# Patient Record
Sex: FEMALE | Race: WHITE | NOT HISPANIC OR LATINO | Employment: STUDENT | URBAN - METROPOLITAN AREA
[De-identification: names, ages, dates, MRNs, and addresses within clinical notes are randomized per-mention and may not be internally consistent; named-entity substitution may affect disease eponyms.]

---
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
## 2021-01-05 ENCOUNTER — OFFICE VISIT (OUTPATIENT)
Dept: OBSTETRICS AND GYNECOLOGY | Facility: CLINIC | Age: 21
End: 2021-01-05

## 2021-01-05 VITALS
HEIGHT: 64 IN | DIASTOLIC BLOOD PRESSURE: 76 MMHG | SYSTOLIC BLOOD PRESSURE: 122 MMHG | BODY MASS INDEX: 25.78 KG/M2 | WEIGHT: 151 LBS

## 2021-01-05 DIAGNOSIS — Z30.430 ENCOUNTER FOR IUD INSERTION: Primary | ICD-10-CM

## 2021-01-05 LAB
B-HCG UR QL: NEGATIVE
INTERNAL NEGATIVE CONTROL: NORMAL
INTERNAL POSITIVE CONTROL: NORMAL
Lab: NORMAL

## 2021-01-05 PROCEDURE — 58300 INSERT INTRAUTERINE DEVICE: CPT | Performed by: NURSE PRACTITIONER

## 2021-01-05 PROCEDURE — 81025 URINE PREGNANCY TEST: CPT | Performed by: NURSE PRACTITIONER

## 2021-01-05 NOTE — PROGRESS NOTES
Procedure: IUD Insertion Procedure Note     Procedures    Pre procedure indication 1) Desires Kyleena  Post procedure indication 1) Desires Kyleena    The risks, benefits, and alternatives to Kyleena were explained at length with the patient. All her questions were answered and consents were signed.    The patient was placed in a dorsal lithotomy position on the examining table in [facility]. A bimanual exam confirmed the uterus was normal in size, anterior.  A warmed metal speculum was inserted into the vagina and the cervix was brought into view.    The cervix was prepped with Betadine. The anterior lip was grasped with a single-tooth tenaculum. The endometrial cavity was then sounded to 7 cm without use of a dilator. This sealed Kyleena package was opened and the IUD was removed in a sterile fashion.    The upper edge of the depth setting the flange was set at a uterine sound measured. The  was then carefully advanced to the cervical canal into the uterus to the level of the fundus.  The slider was then retracted about 1 cm and deployed the device. The device was then gently advanced to the fundus. The IUD was then released by pulling the slider down all the way. The  was removed carefully from the uterus. The threads were then cut leaving 2-3 cm visible outside of the cervix.  The single-tooth tenaculum was removed from the anterior lip. Good hemostasis was noted.     All other instruments were removed from the vagina.   There were no complications.  The patient tolerated the procedure well with a minimal amount of discomfort.    The patient was counseled about the need to return in 4 weeks with U/S for IUD check.     She was counseled about the need to use a backup method of contraception such as condoms until her post insertion exam was performed. The patient verbalized understanding that the Kyleena will need to be removed/replaced after 5 years. The patient is counseled to contact us if she has  any significant or increasing bleeding, pain, fever, chills, or other concerns. She is instructed to see a doctor right away if she believes that she may be pregnant at any time with the IUD in place.    Lisa Marcano, ANNA MARIE  01/05/2021     NDC #83684-566-52  LOT # [id number]  EXP # 09/1/2022  [facility] device

## 2021-01-18 RX ORDER — NORETHINDRONE ACETATE AND ETHINYL ESTRADIOL 1MG-20(21)
KIT ORAL
Qty: 28 TABLET | Refills: 0 | OUTPATIENT
Start: 2021-01-18 | End: 2022-07-08

## 2022-01-06 ENCOUNTER — OFFICE VISIT (OUTPATIENT)
Dept: OBSTETRICS AND GYNECOLOGY | Facility: CLINIC | Age: 22
End: 2022-01-06

## 2022-01-06 VITALS — SYSTOLIC BLOOD PRESSURE: 112 MMHG | DIASTOLIC BLOOD PRESSURE: 62 MMHG | BODY MASS INDEX: 26.61 KG/M2 | WEIGHT: 155 LBS

## 2022-01-06 DIAGNOSIS — Z30.431 IUD CHECK UP: ICD-10-CM

## 2022-01-06 DIAGNOSIS — Z11.3 SCREENING FOR STDS (SEXUALLY TRANSMITTED DISEASES): Primary | ICD-10-CM

## 2022-01-06 DIAGNOSIS — Z01.419 WOMEN'S ANNUAL ROUTINE GYNECOLOGICAL EXAMINATION: ICD-10-CM

## 2022-01-06 PROCEDURE — 99395 PREV VISIT EST AGE 18-39: CPT | Performed by: NURSE PRACTITIONER

## 2022-01-06 NOTE — PROGRESS NOTES
GYN Annual Exam     CC - Here for annual exam.        KATALINA Bahena is a 21 y.o. female, , who presents for annual well woman exam. Patient's last menstrual period was 2021 (approximate)..  Periods are regular every 25-35 days, lasting 2 days.   Dysmenorrhea:mild, occurring first 1-2 days of flow.  Patient reports problems with: none.  Since her last visit the patient underwent surgery for Tonsilectomy. Partner Status: Marital Status: single.  She is sexually active. She has not had new partners since her last STD testing.  She has had her HPV vaccines.     Additional OB/GYN History   Current contraception: contraceptive methods: IUD.  Insertion date: 2021- Nasreen  Desires to: continue contraception  Last Pap : N/A  Last Completed Pap Smear     This patient has no relevant Health Maintenance data.        History of abnormal Pap smear: no  Family history of uterine, colon, breast, or ovarian cancer: yes - Breast- PGM  Performs monthly Self-Breast Exam: yes  Exercises Regularly:yes  Feelings of Anxiety or Depression: no  Tobacco Usage?: No   OB History        0    Para   0    Term   0       0    AB   0    Living   0       SAB   0    IAB   0    Ectopic   0    Molar   0    Multiple   0    Live Births   0                Health Maintenance   Topic Date Due   • ANNUAL PHYSICAL  Never done   • HPV VACCINES (1 - 2-dose series) Never done   • HEPATITIS C SCREENING  Never done   • CHLAMYDIA SCREENING  Never done   • INFLUENZA VACCINE  2021   • COVID-19 Vaccine (3 - Booster for Pfizer series) 2021   • TDAP/TD VACCINES (2 - Td or Tdap) 2023   • Pneumococcal Vaccine 0-64  Aged Out   • MENINGOCOCCAL VACCINE  Aged Out       The additional following portions of the patient's history were reviewed and updated as appropriate: allergies, current medications, past family history, past medical history, past social history, past surgical history and problem list.    Review of  Systems   Constitutional: Negative.    HENT: Negative.    Eyes: Negative.    Respiratory: Negative.    Cardiovascular: Negative.    Gastrointestinal: Negative.    Endocrine: Negative.    Genitourinary: Negative.    Musculoskeletal: Negative.    Skin: Negative.    Allergic/Immunologic: Negative.    Neurological: Negative.    Hematological: Negative.    Psychiatric/Behavioral: Negative.          I have reviewed and agree with the HPI, ROS, and historical information as entered above. Lisa Sheriff Krys, APRN    Objective   /62   Wt 70.3 kg (155 lb)   LMP 12/29/2021 (Approximate)   Breastfeeding No   BMI 26.61 kg/m²     Physical Exam  Vitals and nursing note reviewed. Exam conducted with a chaperone present.   Constitutional:       General: She is not in acute distress.     Appearance: Normal appearance. She is not ill-appearing.   Pulmonary:      Effort: Pulmonary effort is normal.   Abdominal:      General: There is no distension.      Palpations: Abdomen is soft. There is no mass.      Tenderness: There is no abdominal tenderness. There is no guarding or rebound.      Hernia: No hernia is present.   Genitourinary:     General: Normal vulva.      Vagina: Normal.      Cervix: Normal.      Uterus: Normal.       Adnexa: Right adnexa normal and left adnexa normal.      Comments: IUD strings approx 3 cm  Skin:     General: Skin is warm and dry.   Neurological:      Mental Status: She is alert and oriented to person, place, and time.   Psychiatric:         Mood and Affect: Mood normal.         Behavior: Behavior normal.            Assessment and Plan    Problem List Items Addressed This Visit     None      Visit Diagnoses     Screening for STDs (sexually transmitted diseases)    -  Primary    Relevant Orders    Chlamydia trachomatis, Neisseria gonorrhoeae, PCR w/ confirmation - Swab, Cervix    Women's annual routine gynecological examination        IUD check up              1. GYN annual well woman exam.    2. Encouraged use of condoms for STD prevention.  3. Reviewed monthly self breast exams.  Instructed to call with lumps, pain, or breast discharge.    4. Reviewed exercise as a preventative health measures.   5. RTC in 1 year or PRN with problems  Return for Annual physical.      Lisa Marcano, APRN  01/06/2022

## 2022-01-08 LAB
C TRACH RRNA SPEC QL NAA+PROBE: NEGATIVE
N GONORRHOEA RRNA SPEC QL NAA+PROBE: NEGATIVE

## 2022-07-01 ENCOUNTER — TELEPHONE (OUTPATIENT)
Dept: OBSTETRICS AND GYNECOLOGY | Facility: CLINIC | Age: 22
End: 2022-07-01

## 2022-07-01 NOTE — TELEPHONE ENCOUNTER
Pt. Will be back around May of next year. Informed as long as she is not having any issues it would be fine. She VU

## 2022-07-01 NOTE — TELEPHONE ENCOUNTER
Pt is due for annual in January. She will be in [place] for an extended time at that time. She wants to know if it is ok to wait longer than one year to check her IUD and schedule her annual for when she is back in the [place]

## 2022-07-05 ENCOUNTER — LAB (OUTPATIENT)
Dept: OBSTETRICS AND GYNECOLOGY | Facility: CLINIC | Age: 22
End: 2022-07-05

## 2022-07-05 ENCOUNTER — TELEPHONE (OUTPATIENT)
Dept: OBSTETRICS AND GYNECOLOGY | Facility: CLINIC | Age: 22
End: 2022-07-05

## 2022-07-05 DIAGNOSIS — Z32.00 POSSIBLE PREGNANCY, NOT CONFIRMED: Primary | ICD-10-CM

## 2022-07-05 NOTE — TELEPHONE ENCOUNTER
Per pt she has been having some light spotting and L sided cramping. She states she cannot find her IUD strings and she had a + UPT 2 days ago.     I recommended she come in today for a STAT HCG to confirm pregnancy.    She VU and asked that we do not share any information with anyone but her.

## 2022-07-05 NOTE — TELEPHONE ENCOUNTER
Patient lvm states she had iud placed 1/2 year ago, and cannot find her strings, is concerned it may have moved

## 2022-07-06 ENCOUNTER — OFFICE VISIT (OUTPATIENT)
Dept: OBSTETRICS AND GYNECOLOGY | Facility: CLINIC | Age: 22
End: 2022-07-06

## 2022-07-06 VITALS — BODY MASS INDEX: 26.19 KG/M2 | DIASTOLIC BLOOD PRESSURE: 62 MMHG | WEIGHT: 152.6 LBS | SYSTOLIC BLOOD PRESSURE: 100 MMHG

## 2022-07-06 DIAGNOSIS — O00.202 LEFT OVARIAN PREGNANCY WITHOUT INTRAUTERINE PREGNANCY: Primary | ICD-10-CM

## 2022-07-06 DIAGNOSIS — Z32.01 POSITIVE PREGNANCY TEST: ICD-10-CM

## 2022-07-06 DIAGNOSIS — Z30.432 ENCOUNTER FOR IUD REMOVAL: ICD-10-CM

## 2022-07-06 LAB — HCG INTACT+B SERPL-ACNC: 227 MIU/ML

## 2022-07-06 PROCEDURE — 99214 OFFICE O/P EST MOD 30 MIN: CPT | Performed by: NURSE PRACTITIONER

## 2022-07-06 PROCEDURE — 58301 REMOVE INTRAUTERINE DEVICE: CPT | Performed by: NURSE PRACTITIONER

## 2022-07-06 RX ORDER — FLUOXETINE HYDROCHLORIDE 40 MG/1
CAPSULE ORAL
COMMUNITY
Start: 2022-04-28

## 2022-07-06 RX ORDER — HYDROXYZINE HYDROCHLORIDE 10 MG/1
TABLET, FILM COATED ORAL
COMMUNITY
Start: 2022-04-28 | End: 2022-07-08

## 2022-07-06 NOTE — PROGRESS NOTES
Chief complaint:  Has positive UPT and has IUD         HPI  Mary Bahena is a 21 y.o. female, , who presents for possible ectopic pregnancy.  Patient with +UPT 7/3/2022, and was not able to feel IUD strings.  She also c/o mild LLQ pain, left shoulder pain and scant spotting x 1 week.  Kyleena IUD was inserted in 2021. Pt. States has had regular monthly periods since insertion. She did not return for 4 week follow up ultrasound scheduled after insertion in 2021. She did have an annual exam 2022 and IUD strings were visualized on exam at that visit. LMP 22 and did not have period in 2022. States started feeling LLQ pain and was unable to feel IUD strings so took UPT and was positive 2 days ago. STAT HCG yesterday= 227.     Recent Tests:  She had a STAT HCG 2022 that resulted 227. She has not had methotrexate. She had a pelvic ultrasound today and the findings were suspicious for ectopic pregnancy on left adnexa.. The patient complains of cramping pain.  The pain is located in her left-lower quadrant and pain in her left shoulder. Her past medical history is non-contributory.    Rh Status: Unknown  She reports no additional symptoms or complaints.    The additional following portions of the patient's history were reviewed and updated as appropriate: allergies, current medications, past family history, past medical history, past social history, past surgical history and problem list.  Review of Systems  Review of Systems   Constitutional: Negative.    Cardiovascular: Negative.    Gastrointestinal: Positive for abdominal pain ( LLQ pain).   Genitourinary: Positive for pelvic pain (mild) and vaginal bleeding (spotting).        I have reviewed and agree with the HPI, ROS, and historical information as entered above. Luna Torres, APRN    Objective   Physical Exam  /62   Wt 69.2 kg (152 lb 9.6 oz)   LMP 2022 (Exact Date)   Breastfeeding No   BMI 26.19 kg/m²     Physical  Exam  Vitals and nursing note reviewed. Exam conducted with a chaperone present.   Constitutional:       Appearance: Normal appearance. She is normal weight.   Abdominal:      Tenderness: There is abdominal tenderness in the suprapubic area and left lower quadrant.      Comments: Mild LLQ tenderness on exam, IUD strings visualized and IUD was removed today due to suspected ectoptic pregnancy left adnexa   Genitourinary:     General: Normal vulva.      Labia:         Right: No rash, tenderness or lesion.         Left: No rash, tenderness or lesion.       Uterus: Not enlarged and not tender.       Adnexa:         Left: Mass and tenderness present.       Comments: Mild tenderness left adnexa, suspected ectopic pregnancy measuring 23 x 15 x 15 mm    IUD strings visualized on exam   Neurological:      Mental Status: She is alert.           Assessment and Plan    Problem List Items Addressed This Visit    None     Visit Diagnoses     Left ovarian pregnancy without intrauterine pregnancy    -  Primary    Relevant Orders    HCG, B-subunit, Quantitative    CBC (No Diff)    Comprehensive Metabolic Panel    ABO / Rh    Positive pregnancy test        Relevant Orders    US Ob Transvaginal          1. Ultrasound findings reviewed with  on call MD, ( patient). Plan per  is to order STAT labs and prepare for MTX tomorrow.   2. Pelvic Rest.  No douching, intercourse or use of tampons.  3. Call for an increase in bleeding, abdominal pain, or fever.  4. Serial HCG.  5. Report to ED if increase in pain or bleeding.    6. Ultrasound findings reviewed with pt. Suspected ectopic pregnancy left adnexa. STAT HCG, CBC, CMP, TYPE & Rh NOW. HCG from 7/5= 227.   7. Will review labs and call out patient infusion tomorrow morning to schedule MTX if labs are ok.   8. IUD removed today without difficulty.   9. Pt. Understands must go to ER at [facility] for severe pain or heavy bleeding and cannot wait  until tomorrow if pain becomes severe.   10. Blood type unknown and ordered with labs today.   11. Methotrexate consent signed and scanned in chart today.   12. Pt. Has a written plan for moving forward following MTX tomorrow. She has order for repeat HCG on Saturday 7/9/2022.  13. Will RTC in one week for follow up and repeat ultrasound with  or Callum THRASHER.      ANNA MARIE Camacho  07/06/2022

## 2022-07-06 NOTE — PROGRESS NOTES
IUD Removal Procedure Note    Procedures    Type of IUD:  Kyleena   Date of insertion:  1/5/2021  Reason for removal:  had + UPT at home, U/S shows IUD in HIEU and ectopic pregnancy    Procedure Time Out Documentation    Procedure Details  IUD strings visible:  yes  Removal:  IUD strings grasped and IUD removed intact with gentle traction.  The patient tolerated the procedure well.    All appropriate instructions regarding removal were reviewed.    Tolerated well  No apparent complications  Post procedure diagnosis : IUD removal     Plans for contraception:  Interested in Nexplanon    The patient was advised to call for any fever or for prolonged or severe pain or bleeding. She was advised to use motrin as needed for mild to moderate pain.     SHE CURRENTLY HAS AN ECTOPIC PREGNANCY IN LEFT ADNEXA. IUD REMOVED DUE TO BEING IN HIEU/CERVIX ON ULTRASOUND.     Luna Torres, ANNA MARIE  07/06/2022

## 2022-07-07 ENCOUNTER — APPOINTMENT (OUTPATIENT)
Dept: ONCOLOGY | Facility: HOSPITAL | Age: 22
End: 2022-07-07

## 2022-07-07 ENCOUNTER — TELEPHONE (OUTPATIENT)
Dept: OBSTETRICS AND GYNECOLOGY | Facility: CLINIC | Age: 22
End: 2022-07-07

## 2022-07-07 NOTE — TELEPHONE ENCOUNTER
See office notes from yesterday's visit on 7/6/22. This patient was worked up for a suspected ectopic pregnancy in left adnexa. See ultrasound report 7/6/22. She had HCG on 7/5/22= 227, and repeat HCG 7/6= 487. She had STAT labs 7/6 CBC, CMP, HCG, and Type & Rh.  MBT= B negative. She has had a very scant amount of spotting since she missed her period in June. She complained of LLQ pain and left shoulder pain for past week and rates pain today 5/10 today.  LMP 5/22/22 was a normal cycle. She has had Kyleena IUD since 1/2021 and did not return for 4 week follow up ultrasound following insertion. States she has had regular monthly periods since IUD was inserted.  IUD was removed yesterday since U/S showed it was incorrectly positioned in HIEU/cervix area.   Pt. Had recent intercourse on 5/22/22, 6/3, 6/11, and 6/18 so conception date can not be determined.     She was scheduled to have MTX injection this afternoon but since HCG steven appropriately will need to reevaluate tomorrow.     Discussed findings with , and plan is for patient to have  STAT HCG and progesterone levels tomorrow 7/8 in am. Based on labs and pain will determine if MTX indicated tomorrow. I  Tried to schedule possible MTX injections for tomorrow afternoon but they are completely booked. If this patient needs MTX prior to Monday, will need to be given in ED. Ectopic precautions given to patient. She understands she must be seen for increased pelvic pain or heavy bleeding.    She understands since blood type is B negative she will need Rhogam when she comes in office tomorrow for STAT HCG and progesterone level in am.

## 2022-07-08 ENCOUNTER — HOSPITAL ENCOUNTER (EMERGENCY)
Facility: HOSPITAL | Age: 22
Discharge: HOME | End: 2022-07-08
Attending: OBSTETRICS & GYNECOLOGY | Admitting: OBSTETRICS & GYNECOLOGY

## 2022-07-08 ENCOUNTER — TELEPHONE (OUTPATIENT)
Dept: OBSTETRICS AND GYNECOLOGY | Facility: CLINIC | Age: 22
End: 2022-07-08

## 2022-07-08 ENCOUNTER — LAB (OUTPATIENT)
Dept: OBSTETRICS AND GYNECOLOGY | Facility: CLINIC | Age: 22
End: 2022-07-08

## 2022-07-08 VITALS
WEIGHT: 150 LBS | HEIGHT: 64 IN | OXYGEN SATURATION: 96 % | DIASTOLIC BLOOD PRESSURE: 72 MMHG | TEMPERATURE: 98.6 F | RESPIRATION RATE: 16 BRPM | SYSTOLIC BLOOD PRESSURE: 104 MMHG | HEART RATE: 87 BPM | BODY MASS INDEX: 25.61 KG/M2

## 2022-07-08 DIAGNOSIS — Z34.90 PREGNANCY, UNSPECIFIED GESTATIONAL AGE: Primary | ICD-10-CM

## 2022-07-08 PROBLEM — O00.102 LEFT TUBAL PREGNANCY WITHOUT INTRAUTERINE PREGNANCY: Status: ACTIVE | Noted: 2022-07-08

## 2022-07-08 PROCEDURE — 25010000002 METHOTREXATE PF 100 MG/4ML SOLUTION: Performed by: OBSTETRICS & GYNECOLOGY

## 2022-07-08 PROCEDURE — 96372 THER/PROPH/DIAG INJ SC/IM: CPT

## 2022-07-08 PROCEDURE — 96372 THER/PROPH/DIAG INJ SC/IM: CPT | Performed by: OBSTETRICS & GYNECOLOGY

## 2022-07-08 PROCEDURE — 99283 EMERGENCY DEPT VISIT LOW MDM: CPT

## 2022-07-08 RX ORDER — METHOTREXATE 25 MG/ML
50 INJECTION, SOLUTION INTRA-ARTERIAL; INTRAMUSCULAR; INTRATHECAL; INTRAVENOUS ONCE
Status: COMPLETED | OUTPATIENT
Start: 2022-07-08 | End: 2022-07-08

## 2022-07-08 RX ADMIN — METHOTREXATE 86 MG: 25 SOLUTION INTRA-ARTERIAL; INTRAMUSCULAR; INTRATHECAL; INTRAVENOUS at 14:54

## 2022-07-08 NOTE — TELEPHONE ENCOUNTER
Reviewed hcg levels with LOS. She recommended moving forward with the MTX. Rhogam was given this morning while in the office. Called outpt infusion to schedule and the PCT I spoke with spoke with someone else, came back and said they did not have the staff to accommodate anymore work ins today. Per LOS: pt. Will need to go to the ER for MTX. Informed patient of this recommendation. Spoke with the ER charge nurse who discussed with Dr. Barba. She will have to come in with an OBGYN consult, then LOS would need to come to the ER to  patient on the MTX and put in the order. Informed that we had already done this yesterday when the patient was here. Dr. Barba stated that the counseling needed to be done today before they could give the injection. LOS aware.

## 2022-07-08 NOTE — TELEPHONE ENCOUNTER
Informed pt of results, however, must review with LOS who was in surgery today. Will call back when I am able to speak with LOS

## 2022-07-08 NOTE — H&P
Mary Bahena is a 21 y.o. female, , who presents for MTX. Unable to given in office due to [facility] restrictions and outpatient infusion too bust to work her in.  Patient with +UPT 7/3/2022, and was not able to feel IUD strings.  She was seen by Luna CMGILL and IUD was removed and u/s performed suspicious for ectopic with 2 cm mass in fallopian tube on left.  Her HCG however doubled and therefore MTX was not given on that day.  Today however her HCG decreased and it is now safe to go ahead with MTX knowing this is not a viable or heterotopic pregnancy.  IUD was inserted  but she did not follow up for placement.  She did not return for 4 week follow up ultrasound scheduled after insertion in 2021. She did have an annual exam 2022 and IUD strings were visualized on exam at that visit. LMP 22 and did not have period in 2022. States started feeling LLQ pain and was unable to feel IUD strings so took UPT and was positive 2 days ago. STAT HCG yesterday= 227.      Recent Tests:     The additional following portions of the patient's history were reviewed and updated as appropriate: allergies, current medications, past family history, past medical history, past social history, past surgical history and problem list.    Review of Systems  Review of Systems   Constitutional: Negative.    Cardiovascular: Negative.    Gastrointestinal: Positive for abdominal pain ( LLQ pain).   Genitourinary: Positive for pelvic pain (mild) and vaginal bleeding (spotting).         I have reviewed and agree with the HPI, ROS, and historical information as entered above.      Objective      Physical Exam  Vitals:    22 1331   BP:    Pulse:    Resp:    Temp:    SpO2: 95%      Breastfeeding No   BMI 26.19 kg/m²      Physical Exam    Physical Exam  Constitutional:       Appearance: She is well-developed.   HENT:      Head: Normocephalic.   Eyes:      Conjunctiva/sclera: Conjunctivae normal.   Pulmonary:       Effort: Pulmonary effort is normal.   Psychiatric:         Behavior: Behavior normal.       Constitutional:       Appearance: Normal appearance. She is normal weight.   Abdominal:      Tenderness: There is minimal abdominal tenderness today.    Neurological:      Mental Status: She is alert.             Assessment      Assessment and Plan     Left ectopic pregnancy with no evidence of rupture and decreasing HCG.  Good candidate for MTX.  Rh negative          1. Ultrasound findings and labs reviewed again with patient.    2. Patient desires medical treatment with MTX.  3. Pelvic Rest.  No douching, intercourse or use of tampons.  4. Call for an increase in bleeding, abdominal pain, or fever.  5. Serial HCG.  6. Report to ED if increase in pain or bleeding.    7. Pt. Understands must go to ER at [facility] for severe pain  Until which time it is deemed the MTX to be effective.  F/U for HCG  Tuesday and then again on day 7.  She is to expect vaginal bleeding while the HCG levels are falling.   8. Blood type negative.  Rhogam given this am in office.  9. Methotrexate consent signed and already in chart.

## 2022-07-08 NOTE — TELEPHONE ENCOUNTER
PT'S MOTHER (OLGA) CALLED AND IS VERY CONCERNED. SHE AND DINORA BOTH HAVE QUESTIONS AND WOULD LIKE TO SPEAK WITH SOMEONE.

## 2022-07-11 ENCOUNTER — LAB (OUTPATIENT)
Dept: OBSTETRICS AND GYNECOLOGY | Facility: CLINIC | Age: 22
End: 2022-07-11

## 2022-07-11 ENCOUNTER — TELEPHONE (OUTPATIENT)
Dept: OBSTETRICS AND GYNECOLOGY | Facility: CLINIC | Age: 22
End: 2022-07-11

## 2022-07-11 DIAGNOSIS — Z34.90 PREGNANCY, UNSPECIFIED GESTATIONAL AGE: Primary | ICD-10-CM

## 2022-07-11 LAB — HCG INTACT+B SERPL-ACNC: 72.9 MIU/ML

## 2022-07-11 NOTE — TELEPHONE ENCOUNTER
Pt was told to r/s her appt for this Fri. Front staff cannot r/s her and she would like to speak to nurse that it would be ok to keep current appt.    Please advise.

## 2022-07-11 NOTE — TELEPHONE ENCOUNTER
This patient was seen for an ectopic preg in ED 7/8/22.Per pt Dr Mayorga informed her to come in for blood draw today and Friday. Luna called her today to tell her she also needs an us Friday with an appt. Call sent to [facility] to schedule an appt for US and NP or MD Friday.

## 2022-07-15 ENCOUNTER — LAB (OUTPATIENT)
Dept: OBSTETRICS AND GYNECOLOGY | Facility: CLINIC | Age: 22
End: 2022-07-15

## 2022-07-15 ENCOUNTER — TELEPHONE (OUTPATIENT)
Dept: OBSTETRICS AND GYNECOLOGY | Facility: CLINIC | Age: 22
End: 2022-07-15

## 2022-07-15 DIAGNOSIS — O00.102 LEFT TUBAL PREGNANCY WITHOUT INTRAUTERINE PREGNANCY: Primary | ICD-10-CM

## 2022-07-15 NOTE — TELEPHONE ENCOUNTER
PT ASKED IF THE U/S SHE HAS SCHEDULED FOR TODAY IS NECESSARY OR IF SHE COULD HAVE IT DONE ANOTHER TIME AND JUST HAVE THE BLOODWORK DONE TODAY, STATED SHE WOULD HAVE TO RESCHEDULE OTHERWISE

## 2022-07-15 NOTE — TELEPHONE ENCOUNTER
Pt. Reports heavy bleeding and passing out earlier this week. States her bleeding has slowed now and she is feeling fine. Per TH: come in for stat hcg, cbc, and cmp to ensure she does not need another dose of MTX. Pt. ELDA will come over as soon as she can for labs only.

## 2022-07-20 LAB
ALBUMIN SERPL-MCNC: 4.4 G/DL (ref 3.5–5.2)
ALBUMIN/GLOB SERPL: 2.3 G/DL
ALP SERPL-CCNC: 42 U/L (ref 39–117)
ALT SERPL-CCNC: 11 U/L (ref 1–33)
AST SERPL-CCNC: 15 U/L (ref 1–32)
BILIRUB SERPL-MCNC: 0.3 MG/DL (ref 0–1.2)
BUN SERPL-MCNC: 11 MG/DL (ref 6–20)
BUN/CREAT SERPL: 19 (ref 7–25)
CALCIUM SERPL-MCNC: 9 MG/DL (ref 8.6–10.5)
CHLORIDE SERPL-SCNC: 105 MMOL/L (ref 98–107)
CO2 SERPL-SCNC: 29 MMOL/L (ref 22–29)
CREAT SERPL-MCNC: 0.58 MG/DL (ref 0.57–1)
EGFRCR SERPLBLD CKD-EPI 2021: 133 ML/MIN/1.73
ERYTHROCYTE [DISTWIDTH] IN BLOOD BY AUTOMATED COUNT: 13.4 % (ref 12.3–15.4)
GLOBULIN SER CALC-MCNC: 1.9 GM/DL
GLUCOSE SERPL-MCNC: 77 MG/DL (ref 65–99)
HCG INTACT+B SERPL-ACNC: 14 MIU/ML
HCT VFR BLD AUTO: 35.6 % (ref 34–46.6)
HGB BLD-MCNC: 11.7 G/DL (ref 12–15.9)
MCH RBC QN AUTO: 29.8 PG (ref 26.6–33)
MCHC RBC AUTO-ENTMCNC: 32.8 G/DL (ref 31.5–35.7)
MCV RBC AUTO: 90.6 FL (ref 79–97)
PLATELET # BLD AUTO: 342 10E3/MM3 (ref 140–450)
POTASSIUM SERPL-SCNC: 4.4 MMOL/L (ref 3.5–5.2)
PROT SERPL-MCNC: 6.3 G/DL (ref 6–8.5)
RBC # BLD AUTO: 3.93 10E6/MM3 (ref 3.77–5.28)
SODIUM SERPL-SCNC: 141 MMOL/L (ref 136–145)
WBC # BLD AUTO: 5.39 10E3/MM3 (ref 3.4–10.8)

## 2022-07-22 ENCOUNTER — LAB (OUTPATIENT)
Dept: OBSTETRICS AND GYNECOLOGY | Facility: CLINIC | Age: 22
End: 2022-07-22

## 2022-07-23 LAB
ABO GROUP BLD: NORMAL
ERYTHROCYTE [DISTWIDTH] IN BLOOD BY AUTOMATED COUNT: 13.4 % (ref 12.3–15.4)
HCT VFR BLD AUTO: 35.7 % (ref 34–46.6)
HGB BLD-MCNC: 12 G/DL (ref 12–15.9)
MCH RBC QN AUTO: 29.6 PG (ref 26.6–33)
MCHC RBC AUTO-ENTMCNC: 33.6 G/DL (ref 31.5–35.7)
MCV RBC AUTO: 88.1 FL (ref 79–97)
PLATELET # BLD AUTO: 381 10*3/MM3 (ref 140–450)
RBC # BLD AUTO: 4.05 10*6/MM3 (ref 3.77–5.28)
RH BLD: NEGATIVE
WBC # BLD AUTO: 5.09 10*3/MM3 (ref 3.4–10.8)

## 2022-08-02 ENCOUNTER — OFFICE VISIT (OUTPATIENT)
Dept: OBSTETRICS AND GYNECOLOGY | Facility: CLINIC | Age: 22
End: 2022-08-02

## 2022-08-02 VITALS — WEIGHT: 151 LBS | DIASTOLIC BLOOD PRESSURE: 64 MMHG | SYSTOLIC BLOOD PRESSURE: 118 MMHG | BODY MASS INDEX: 25.92 KG/M2

## 2022-08-02 DIAGNOSIS — Z30.017 NEXPLANON INSERTION: Primary | ICD-10-CM

## 2022-08-02 LAB
B-HCG UR QL: NEGATIVE
EXPIRATION DATE: NORMAL
INTERNAL NEGATIVE CONTROL: NORMAL
INTERNAL POSITIVE CONTROL: NORMAL
Lab: NORMAL

## 2022-08-02 PROCEDURE — 11981 INSERTION DRUG DLVR IMPLANT: CPT | Performed by: NURSE PRACTITIONER

## 2022-08-02 PROCEDURE — 81025 URINE PREGNANCY TEST: CPT | Performed by: NURSE PRACTITIONER

## 2022-08-19 LAB
ABO GROUP BLD: NORMAL
ALBUMIN SERPL-MCNC: 4.7 G/DL (ref 3.5–5.2)
ALBUMIN/GLOB SERPL: 2.5 G/DL
ALP SERPL-CCNC: 44 U/L (ref 39–117)
ALT SERPL-CCNC: 9 U/L (ref 1–33)
AST SERPL-CCNC: 16 U/L (ref 1–32)
BILIRUB SERPL-MCNC: 0.3 MG/DL (ref 0–1.2)
BUN SERPL-MCNC: 8 MG/DL (ref 6–20)
BUN/CREAT SERPL: 13.8 (ref 7–25)
CALCIUM SERPL-MCNC: 9.2 MG/DL (ref 8.6–10.5)
CHLORIDE SERPL-SCNC: 100 MMOL/L (ref 98–107)
CO2 SERPL-SCNC: 27 MMOL/L (ref 22–29)
CREAT SERPL-MCNC: 0.58 MG/DL (ref 0.57–1)
EGFRCR-CYS SERPLBLD CKD-EPI 2021: 133.1 ML/MIN/1.73
ERYTHROCYTE [DISTWIDTH] IN BLOOD BY AUTOMATED COUNT: 13.8 % (ref 12.3–15.4)
GLOBULIN SER CALC-MCNC: 1.9 GM/DL
GLUCOSE SERPL-MCNC: 81 MG/DL (ref 65–99)
HCG INTACT+B SERPL-ACNC: 487 MIU/ML
HCT VFR BLD AUTO: 37.5 % (ref 34–46.6)
HGB BLD-MCNC: 12.5 G/DL (ref 12–15.9)
MCH RBC QN AUTO: 30.3 PG (ref 26.6–33)
MCHC RBC AUTO-ENTMCNC: 33.3 G/DL (ref 31.5–35.7)
MCV RBC AUTO: 90.8 FL (ref 79–97)
PLATELET # BLD AUTO: 377 10E3/MM3 (ref 140–450)
POTASSIUM SERPL-SCNC: 3.7 MMOL/L (ref 3.5–5.2)
PROT SERPL-MCNC: 6.6 G/DL (ref 6–8.5)
RBC # BLD AUTO: 4.13 10E6/MM3 (ref 3.77–5.28)
RH BLD: NEGATIVE
SODIUM SERPL-SCNC: 136 MMOL/L (ref 136–145)
WBC # BLD AUTO: 7.68 10E3/MM3 (ref 3.4–10.8)

## 2022-08-23 LAB
HCG INTACT+B SERPL-ACNC: 251 MIU/ML
PROGEST SERPL-MCNC: 2.28 NG/ML

## 2024-01-09 ENCOUNTER — HOSPITAL ENCOUNTER (OUTPATIENT)
Facility: HOSPITAL | Age: 24
Discharge: HOME | End: 2024-01-09
Attending: INTERNAL MEDICINE | Admitting: INTERNAL MEDICINE
Payer: COMMERCIAL

## 2024-01-09 VITALS — DIASTOLIC BLOOD PRESSURE: 71 MMHG | TEMPERATURE: 98.3 F | SYSTOLIC BLOOD PRESSURE: 109 MMHG | RESPIRATION RATE: 16 BRPM

## 2024-01-09 PROCEDURE — G0378 HOSPITAL OBSERVATION PER HR: HCPCS

## 2024-01-09 NOTE — NURSING NOTE
Patient was accepted under hospitalist service as a direct admit from colorectal surgery center after colonoscopy. Upon arrival to floor, patient stated she wanted to go home. MD Jimenez assessed patient and spoke with MD Ortiz, patient is cleared to discharge home and instructed to call colorectal surgery office with any concerns or questions.

## 2024-01-13 NOTE — H&P
[facility] Medicine Services  HISTORY AND PHYSICAL    Patient Name: Mary Bahena  : 2000  MRN: [id number]  Primary Care Physician: Jeremísa Johnson MD  Date of admission: 2024      Subjective   Subjective     Chief Complaint:  Abd pain     HPI:  Mary Bahena is a 23 y.o. female with recent abdominal pain, who had colonoscopy today (Dr Ortiz), felt very bad afterward and was referred for direct admission to [facility].  Upon arriving, however, she felt better and wished to go home immediately.  After discussion w Dr Ortiz by phone, she is being discharged.  She did not get IV fluids or any medicines and stayed only a few minutes.     Dr Ortiz has called medicines including steroids in to her pharmacy.       Personal History     Past Medical History:   Diagnosis Date    Anxiety     Depression     HPV vaccine series completed            Past Surgical History:   Procedure Laterality Date    REDUCTION MAMMAPLASTY      TONSILLECTOMY      WISDOM TOOTH EXTRACTION         Family History: family history includes Breast cancer in her paternal grandmother; No Known Problems in an other family member.     Social History:  reports that she has never smoked. She has never used smokeless tobacco. She reports current alcohol use of about 1.0 - 2.0 standard drink of alcohol per week. She reports that she does not use drugs.  Social History     Social History Narrative    Not on file       Medications:  Available home medication information reviewed.  FLUoxetine, albuterol sulfate HFA, hydrOXYzine, norethindrone-ethinyl estradiol FE, and ondansetron ODT    Allergies   Allergen Reactions    Sulfa Antibiotics GI Intolerance       Objective   Objective     Vital Signs:           Physical Exam    Gen:  WD/WN.  Father present.  Mother listening in by phone   Neuro: alert and oriented, clear speech, follows commands, grossly nonfocal  HEENT:  NC/AT PERRL, OP benign  Neck:  Supple, no LAD  Heart RRR  no murmur, rub, or gallop  Abd:  Soft, mildly tender   Extrem:  No c/c/e      Result Review:  I have personally reviewed the results from the time of this admission to 1/13/2024 14:20 EST and agree with these findings:  []  Laboratory list / accordion  []  Microbiology  []  Radiology  []  EKG/Telemetry   []  Cardiology/Vascular   []  Pathology  []  Old records  []  Other:  Most notable findings include:        LAB RESULTS:      Lab 01/07/24 1856   PROTIME 13.1   INR 1.2   APTT 29.9             Lab 01/07/24  1856   LIPASE 12                 Lab 01/07/24  1856   ANTIBODY SCREEN Negative             Microbiology Results (last 10 days)       ** No results found for the last 240 hours. **            No radiology results from the last 24 hrs        Assessment & Plan   Assessment & Plan       * No active hospital problems. *     Plan: DC home.  Take meds as prescribed.  Drink fluids to offset diarrhea.  FU with Dr Ortiz.  Return to ED if clinically worsening.       DVT prophylaxis:  No DVT prophylaxis order currently exists.      CODE STATUS:    There are no questions and answers to display.       Expected Discharge   Expected Discharge Date: 1/9/2024; Expected Discharge Time:  3:00 PM     Sugey Jimenez MD  01/13/24

## 2024-01-13 NOTE — DISCHARGE SUMMARY
[facility] Medicine Services  SAME DAY ADMISSION & DISCHARGE    Patient Name: Mary Bahena  : 2000  MRN: [id number]    Date of Admission and Discharge: 2024    Primary Care Physician: Jeremías Johnson MD  Consults       No orders found from 2023 to 1/10/2024.            Subjective   Presentation and Brief Hospital Summary     Chief Complaint:  Colitis [K52.9]    There are no hospital problems to display for this patient.        HPI and Brief Hospital Course:  Mary Bahena is a 23 y.o. female with recent symptoms suggesting IBD.  She had a colonoscopy, had increased pain afterward and was referred for direct admission.  However, upon arrival she felt better.  I discussed plans w patient, parents, and Dr Ortiz. She is going home.  See H and P     Review of Systems   Abd pain, some nausea, tolerating fluids   All other systems reviewed and are negative.    Personal History     Past Medical History:   Diagnosis Date    Anxiety     Depression     HPV vaccine series completed        Past Surgical History:   Procedure Laterality Date    REDUCTION MAMMAPLASTY      TONSILLECTOMY      WISDOM TOOTH EXTRACTION         Family History: family history includes Breast cancer in her paternal grandmother; No Known Problems in an other family member.     Social History:  reports that she has never smoked. She has never used smokeless tobacco. She reports current alcohol use of about 1.0 - 2.0 standard drink of alcohol per week. She reports that she does not use drugs.    Allergies:    Allergies   Allergen Reactions    Sulfa Antibiotics GI Intolerance       Objective   Objective Findings     Vital Signs:            Physical Exam (if not performed and documented at time of presentation on same date):   Gen:  WD/WN  Neuro: alert and oriented, clear speech, follows commands, grossly nonfocal  HEENT:  NC/AT PERRL, OP benign  Neck:  Supple, no LAD  Heart RRR no murmur, rub, or  "gallop  Abd:  Soft, tender   Extrem:  No c/c/e      Results Reviewed:  I have personally reviewed current lab, radiology, and data and agree.    Results from last 7 days   Lab Units 01/07/24  1856   INR INR 1.2           Invalid input(s): \"TROPONININT\"  No results found for: \"PHART\", \"PCO2\"  Imaging Results (Last 24 Hours)       ** No results found for the last 24 hours. **                Discharge Details        Discharge Medications        Continue These Medications        Instructions Start Date   albuterol sulfate  (90 Base) MCG/ACT inhaler  Commonly known as: PROVENTIL HFA;VENTOLIN HFA;PROAIR HFA   INHALE 1 OR 2 PUFFS BY MOUTH DAILY AS NEEDED FOR WHEEZING      FLUoxetine 40 MG capsule  Commonly known as: PROzac   No dose, route, or frequency recorded.      FLUoxetine 10 MG capsule  Commonly known as: PROzac   10 mg, Oral, Daily      hydrOXYzine 25 MG tablet  Commonly known as: ATARAX   25 mg, Oral, Nightly      ondansetron ODT 4 MG disintegrating tablet  Commonly known as: ZOFRAN-ODT   4 mg, Translingual, 4 Times Daily PRN                 Discharge Disposition:  Home or Self Care    Discharge Diet:      Discharge Activity:      CODE STATUS:   There are no questions and answers to display.       Time Spent on Discharge: I spent  30  minutes on this discharge activity which included: face-to-face encounter with the patient, reviewing the data in the system, coordination of the care with the nursing staff as well as consultants, documentation, and entering orders.      Sugey Jimenez MD  01/13/24   "

## 2024-05-07 ENCOUNTER — TRANSCRIBE ORDERS (OUTPATIENT)
Dept: ADMINISTRATIVE | Facility: HOSPITAL | Age: 24
End: 2024-05-07
Payer: COMMERCIAL

## 2024-05-07 DIAGNOSIS — R20.0 NUMBNESS OF LEFT FOOT: Primary | ICD-10-CM

## 2024-08-18 ENCOUNTER — HOSPITAL ENCOUNTER (EMERGENCY)
Facility: HOSPITAL | Age: 24
Discharge: HOME | End: 2024-08-18
Attending: EMERGENCY MEDICINE | Admitting: EMERGENCY MEDICINE
Payer: COMMERCIAL

## 2024-08-18 VITALS
HEIGHT: 64 IN | TEMPERATURE: 98.4 F | HEART RATE: 71 BPM | BODY MASS INDEX: 25.61 KG/M2 | WEIGHT: 150 LBS | RESPIRATION RATE: 20 BRPM | OXYGEN SATURATION: 100 % | SYSTOLIC BLOOD PRESSURE: 102 MMHG | DIASTOLIC BLOOD PRESSURE: 71 MMHG

## 2024-08-18 DIAGNOSIS — G43.101 MIGRAINE WITH AURA AND WITH STATUS MIGRAINOSUS, NOT INTRACTABLE: Primary | ICD-10-CM

## 2024-08-18 PROCEDURE — 99283 EMERGENCY DEPT VISIT LOW MDM: CPT

## 2024-08-18 PROCEDURE — 25010000002 METOCLOPRAMIDE PER 10 MG

## 2024-08-18 PROCEDURE — 96375 TX/PRO/DX INJ NEW DRUG ADDON: CPT

## 2024-08-18 PROCEDURE — 96374 THER/PROPH/DIAG INJ IV PUSH: CPT

## 2024-08-18 PROCEDURE — 25010000002 ONDANSETRON PER 1 MG

## 2024-08-18 PROCEDURE — 25010000002 KETOROLAC TROMETHAMINE PER 15 MG

## 2024-08-18 PROCEDURE — 25810000003 SODIUM CHLORIDE 0.9 % SOLUTION

## 2024-08-18 RX ORDER — KETOROLAC TROMETHAMINE 30 MG/ML
30 INJECTION, SOLUTION INTRAMUSCULAR; INTRAVENOUS ONCE
Status: COMPLETED | OUTPATIENT
Start: 2024-08-18 | End: 2024-08-18

## 2024-08-18 RX ORDER — SODIUM CHLORIDE 9 MG/ML
125 INJECTION, SOLUTION INTRAVENOUS CONTINUOUS
Status: DISCONTINUED | OUTPATIENT
Start: 2024-08-18 | End: 2024-08-18

## 2024-08-18 RX ORDER — SODIUM CHLORIDE 0.9 % (FLUSH) 0.9 %
10 SYRINGE (ML) INJECTION AS NEEDED
Status: DISCONTINUED | OUTPATIENT
Start: 2024-08-18 | End: 2024-08-18 | Stop reason: HOSPADM

## 2024-08-18 RX ORDER — METOCLOPRAMIDE HYDROCHLORIDE 5 MG/ML
10 INJECTION INTRAMUSCULAR; INTRAVENOUS ONCE
Status: COMPLETED | OUTPATIENT
Start: 2024-08-18 | End: 2024-08-18

## 2024-08-18 RX ORDER — ONDANSETRON 4 MG/1
4 TABLET, ORALLY DISINTEGRATING ORAL EVERY 8 HOURS PRN
Qty: 20 TABLET | Refills: 0 | Status: SHIPPED | OUTPATIENT
Start: 2024-08-18

## 2024-08-18 RX ORDER — ONDANSETRON 2 MG/ML
4 INJECTION INTRAMUSCULAR; INTRAVENOUS EVERY 6 HOURS PRN
Status: DISCONTINUED | OUTPATIENT
Start: 2024-08-18 | End: 2024-08-18 | Stop reason: HOSPADM

## 2024-08-18 RX ADMIN — METOCLOPRAMIDE 10 MG: 5 INJECTION, SOLUTION INTRAMUSCULAR; INTRAVENOUS at 14:01

## 2024-08-18 RX ADMIN — SODIUM CHLORIDE 1000 ML: 9 INJECTION, SOLUTION INTRAVENOUS at 15:19

## 2024-08-18 RX ADMIN — SODIUM CHLORIDE 1000 ML: 9 INJECTION, SOLUTION INTRAVENOUS at 14:01

## 2024-08-18 RX ADMIN — ONDANSETRON 4 MG: 2 INJECTION INTRAMUSCULAR; INTRAVENOUS at 14:01

## 2024-08-18 RX ADMIN — KETOROLAC TROMETHAMINE 30 MG: 30 INJECTION, SOLUTION INTRAMUSCULAR at 14:01

## 2024-08-18 NOTE — Clinical Note
[facility] EMERGENCY DEPARTMENT [place]  [address]  [facility] [address]  Phone: [phone]  Fax: [phone]    Mary Bahena was seen and treated in our emergency department on 8/18/2024.  She may return to school on 08/20/2024.  Please excuse Ms. Bahena from 08/16/2024-08/19/2024.         Thank you for choosing [facility].    Amberly Ng, KARLOS

## 2024-08-18 NOTE — FSED PROVIDER NOTE
"Subjective  History of Present Illness:    23-year-old female presents emergency department with complaints of migraine headache.  Patient states symptoms started on Thursday, localized to the left side of her head and behind her left eye.  Patient states the symptoms are worsening and not responding to over-the-counter medications such as Motrin and Excedrin.  Patient also complains of associated visual disturbances, describes wavy vision and circular lights.  Studying, looking at a screen and anything that requires focus aggravate symptoms.  Lying in the dark seems to relieve symptoms.  Patient also complains of associated photophobia, nausea, vomiting and low-grade fever yesterday.  Patient denies any trauma to the head, syncope or presyncope, dizziness, chest pain, shortness of breath or urinary complaints.  Patient has past medical history of ocular migraines, last episode 3 years ago.    Nurses Notes reviewed and agree, including vitals, allergies, social history and prior medical history.     REVIEW OF SYSTEMS: All systems reviewed and not pertinent unless noted.  Review of Systems   Constitutional:  Positive for fever (99.0, yesterday). Negative for chills and fatigue.   HENT:  Negative for congestion, ear pain, facial swelling, hearing loss, rhinorrhea, sinus pressure, sinus pain, sneezing, sore throat, trouble swallowing and voice change.    Eyes:  Positive for photophobia and visual disturbance (\"wavy\" vision, circular white floaters). Negative for pain and discharge.   Respiratory:  Negative for cough, choking, chest tightness and shortness of breath.    Cardiovascular:  Negative for chest pain, palpitations and leg swelling.   Gastrointestinal:  Positive for nausea and vomiting (3 episodes). Negative for abdominal pain, constipation and diarrhea.   Endocrine: Negative for cold intolerance and heat intolerance.   Genitourinary:  Negative for difficulty urinating, frequency and urgency.   Musculoskeletal: " " Negative for arthralgias, back pain, gait problem, myalgias, neck pain and neck stiffness.   Skin:  Negative for color change and pallor.   Neurological:  Positive for headaches. Negative for dizziness, tremors, syncope, facial asymmetry, speech difficulty, weakness, light-headedness and numbness.   Psychiatric/Behavioral:  Negative for agitation, behavioral problems and confusion.    All other systems reviewed and are negative.      Past Medical History:   Diagnosis Date    Anxiety     Depression     HPV vaccine series completed        Allergies:    Sulfa antibiotics      Past Surgical History:   Procedure Laterality Date    REDUCTION MAMMAPLASTY      TONSILLECTOMY      WISDOM TOOTH EXTRACTION           Social History     Socioeconomic History    Marital status: Single   Tobacco Use    Smoking status: Never    Smokeless tobacco: Never   Vaping Use    Vaping status: Never Used   Substance and Sexual Activity    Alcohol use: Yes     Alcohol/week: 1.0 - 2.0 standard drink of alcohol     Types: 1 - 2 Cans of beer per week     Comment: Rare    Drug use: Never    Sexual activity: Yes     Partners: Male     Birth control/protection: Condom, I.U.D.     Comment: Kyleena 1/5/2021         Family History   Problem Relation Age of Onset    No Known Problems Other     Breast cancer Paternal Grandmother        Objective  Physical Exam:  /70 (BP Location: Left arm, Patient Position: Sitting)   Pulse 84   Temp 98.4 °F (36.9 °C) (Oral)   Resp 20   Ht 162.6 cm (64\")   Wt 68 kg (150 lb)   SpO2 96%   BMI 25.75 kg/m²      Physical Exam  Vitals and nursing note reviewed.   Constitutional:       General: She is not in acute distress.     Appearance: Normal appearance. She is not ill-appearing, toxic-appearing or diaphoretic.   HENT:      Head: Normocephalic and atraumatic.      Right Ear: External ear normal.      Left Ear: External ear normal.      Nose: Nose normal.      Mouth/Throat:      Mouth: Mucous membranes are " moist.      Pharynx: Oropharynx is clear.   Eyes:      Extraocular Movements: Extraocular movements intact.      Conjunctiva/sclera: Conjunctivae normal.      Pupils: Pupils are equal, round, and reactive to light.   Cardiovascular:      Rate and Rhythm: Normal rate and regular rhythm.      Heart sounds: Normal heart sounds. No murmur heard.     No friction rub. No gallop.   Pulmonary:      Effort: Pulmonary effort is normal. No respiratory distress.      Breath sounds: Normal breath sounds. No stridor. No wheezing, rhonchi or rales.   Musculoskeletal:         General: No swelling, deformity or signs of injury. Normal range of motion.      Cervical back: Normal range of motion and neck supple.   Skin:     General: Skin is warm and dry.      Capillary Refill: Capillary refill takes less than 2 seconds.      Coloration: Skin is not pale.      Findings: No bruising or rash.   Neurological:      General: No focal deficit present.      Mental Status: She is alert and oriented to person, place, and time.      Cranial Nerves: No cranial nerve deficit.      Sensory: No sensory deficit.      Motor: No weakness.      Coordination: Coordination normal.      Gait: Gait normal.      Deep Tendon Reflexes: Reflexes normal.   Psychiatric:         Mood and Affect: Mood normal.         Thought Content: Thought content normal.       Procedures    ED Course:  Patient presents emergency department with complaints of migraine headache.  Patient hemodynamically stable, afebrile and not in acute distress on arrival.  Physical exam benign, no neurological deficits appreciated.  Patient denies any systemic symptoms.  Presentation consistent with migraine.  Initiated migraine protocol: provided Reglan, Toradol, Zofran and fluids with resolution of symptoms.  Counseled patient on diagnosis and answered all questions, recommended outpatient follow-up with PCP within the next 7 to 10 days for recheck of todays complaint.  Prescribed patient  Zofran to go home with.  Discussed strict return precautions.       Lab Results (last 24 hours)       ** No results found for the last 24 hours. **             No radiology results from the last 24 hrs       MDM        DDX: includes but is not limited to: migraine, tension HA    Patient arrives hemodynamically stable, afebrile, NAD with vitals interpreted by myself.     Pertinent features from physical exam: benign neuro exam, no focal deficits appreciated. AOX4. No CN, sensory, or weakness deficit.    Initial diagnostic plan: Initiate migraine protocol, monitor for improvement.     Results from initial plan were reviewed and interpreted by me revealing resolution of symptoms after Reglan, Zofran, fluids, and Toradol.       Medications   sodium chloride 0.9 % flush 10 mL (has no administration in time range)   ondansetron (ZOFRAN) injection 4 mg (4 mg Intravenous Given 8/18/24 1401)   sodium chloride 0.9 % bolus 1,000 mL (1,000 mL Intravenous New Bag 8/18/24 1401)   metoclopramide (REGLAN) injection 10 mg (10 mg Intravenous Given 8/18/24 1401)   ketorolac (TORADOL) injection 30 mg (30 mg Intravenous Given 8/18/24 1401)       Results/clinical rationale were discussed with attending physician, Dr. Acosta      Data interpreted: Nursing notes reviewed, vital signs reviewed.  Labs independently interpreted by me (CBC, CMP, lipase, UA, troponin, ABG, lactic acid, procalcitonin).  Imaging independently interpreted by me (x-ray, CT scan).  EKG independently interpreted by me.  O2 saturation:    Counseling: Discussed the results above with the patient regarding need for admission or discharge.  Patient understands and agrees plan of care.      -----  ED Disposition       None          Final diagnoses:   None     Your Follow-Up Providers    Follow-up information has not been specified.       Contact information for after-discharge care    Follow-up information has not been specified.          Your medication list        ASK  your doctor about these medications        Instructions Last Dose Given Next Dose Due   albuterol sulfate  (90 Base) MCG/ACT inhaler  Commonly known as: PROVENTIL HFA;VENTOLIN HFA;PROAIR HFA      INHALE 1 OR 2 PUFFS BY MOUTH DAILY AS NEEDED FOR WHEEZING       FLUoxetine 40 MG capsule  Commonly known as: PROzac           FLUoxetine 10 MG capsule  Commonly known as: PROzac      Take 10 mg by mouth Daily.       hydrOXYzine 25 MG tablet  Commonly known as: ATARAX      Take 25 mg by mouth Every Night.       ondansetron ODT 4 MG disintegrating tablet  Commonly known as: ZOFRAN-ODT      Place 1 tablet on the tongue 4 (Four) Times a Day As Needed for Nausea or Vomiting.

## 2024-10-07 ENCOUNTER — OFFICE VISIT (OUTPATIENT)
Dept: OBSTETRICS AND GYNECOLOGY | Facility: CLINIC | Age: 24
End: 2024-10-07
Payer: COMMERCIAL

## 2024-10-07 VITALS
WEIGHT: 152 LBS | BODY MASS INDEX: 25.95 KG/M2 | SYSTOLIC BLOOD PRESSURE: 96 MMHG | DIASTOLIC BLOOD PRESSURE: 62 MMHG | HEIGHT: 64 IN

## 2024-10-07 DIAGNOSIS — Z01.411 ENCOUNTER FOR GYNECOLOGICAL EXAMINATION WITH ABNORMAL FINDING: ICD-10-CM

## 2024-10-07 DIAGNOSIS — R10.2 LEFT ADNEXAL TENDERNESS: ICD-10-CM

## 2024-10-07 DIAGNOSIS — Z01.419 PAP TEST, AS PART OF ROUTINE GYNECOLOGICAL EXAMINATION: Primary | ICD-10-CM

## 2024-10-07 DIAGNOSIS — N89.8 VAGINAL DRYNESS: ICD-10-CM

## 2024-10-07 DIAGNOSIS — N94.10 FEMALE DYSPAREUNIA: ICD-10-CM

## 2024-10-07 DIAGNOSIS — Z30.46 ENCOUNTER FOR SURVEILLANCE OF IMPLANTABLE SUBDERMAL CONTRACEPTIVE: ICD-10-CM

## 2024-10-07 PROBLEM — G43.109 OCULAR MIGRAINE: Status: ACTIVE | Noted: 2024-10-07

## 2024-10-07 PROBLEM — E06.3 HYPERTHYROIDISM WITH HASHIMOTO DISEASE: Status: ACTIVE | Noted: 2024-10-07

## 2024-10-07 PROBLEM — E89.0 H/O PARTIAL THYROIDECTOMY: Status: ACTIVE | Noted: 2024-10-07

## 2024-10-07 PROBLEM — E05.80 HYPERTHYROIDISM WITH HASHIMOTO DISEASE: Status: ACTIVE | Noted: 2024-10-07

## 2024-10-07 PROCEDURE — 99395 PREV VISIT EST AGE 18-39: CPT

## 2024-10-07 PROCEDURE — 99214 OFFICE O/P EST MOD 30 MIN: CPT

## 2024-10-07 RX ORDER — METHYLPHENIDATE HYDROCHLORIDE 18 MG/1
18 TABLET, EXTENDED RELEASE ORAL EVERY MORNING
COMMUNITY

## 2024-10-07 RX ORDER — CALCIUM ACETATE 667 MG/1
1334 CAPSULE ORAL 3 TIMES DAILY
COMMUNITY

## 2024-10-07 RX ORDER — LEVOTHYROXINE SODIUM 100 UG/1
TABLET ORAL DAILY
COMMUNITY
Start: 2024-05-16

## 2024-10-07 RX ORDER — ESTRADIOL 0.1 MG/G
CREAM VAGINAL
Qty: 1 EACH | Refills: 12 | Status: SHIPPED | OUTPATIENT
Start: 2024-10-07

## 2024-10-11 LAB — REF LAB TEST METHOD: NORMAL

## 2025-04-22 ENCOUNTER — HOSPITAL ENCOUNTER (OUTPATIENT)
Dept: GENERAL RADIOLOGY | Facility: HOSPITAL | Age: 25
Discharge: HOME | End: 2025-04-22
Payer: COMMERCIAL

## 2025-04-22 ENCOUNTER — TRANSCRIBE ORDERS (OUTPATIENT)
Dept: GENERAL RADIOLOGY | Facility: HOSPITAL | Age: 25
End: 2025-04-22
Payer: COMMERCIAL

## 2025-04-22 DIAGNOSIS — R10.9 ACUTE ABDOMINAL PAIN: Primary | ICD-10-CM

## 2025-04-22 DIAGNOSIS — R10.9 ACUTE ABDOMINAL PAIN: ICD-10-CM

## 2025-04-22 PROCEDURE — 74019 RADEX ABDOMEN 2 VIEWS: CPT

## 2025-06-10 ENCOUNTER — OFFICE VISIT (OUTPATIENT)
Dept: OBSTETRICS AND GYNECOLOGY | Facility: CLINIC | Age: 25
End: 2025-06-10
Payer: COMMERCIAL

## 2025-06-10 VITALS
BODY MASS INDEX: 27.14 KG/M2 | SYSTOLIC BLOOD PRESSURE: 112 MMHG | HEIGHT: 64 IN | WEIGHT: 159 LBS | DIASTOLIC BLOOD PRESSURE: 70 MMHG

## 2025-06-10 DIAGNOSIS — Z30.46 NEXPLANON REMOVAL: ICD-10-CM

## 2025-06-10 DIAGNOSIS — Z30.017 NEXPLANON INSERTION: Primary | ICD-10-CM

## 2025-06-10 LAB
B-HCG UR QL: NEGATIVE
EXPIRATION DATE: NORMAL
INTERNAL NEGATIVE CONTROL: NEGATIVE
INTERNAL POSITIVE CONTROL: POSITIVE
Lab: NORMAL

## 2025-06-10 NOTE — PROGRESS NOTES
"            Procedure: Nexplanon Removal and Reinsertion     Procedures    Pre Dx: 1) Nexplanon removal and Reinsertion   Post Dx: 1) Nexplanon removal and Reinsertion   /70   Ht 162.6 cm (64\")   Wt 72.1 kg (159 lb)   LMP 05/28/2025   BMI 27.29 kg/m²       The risks, benefits, and alternatives to removal and reinsertion of the device have been discussed with the patient at length. All of her questions are answered. She is aware of the need for alternative means of contraception if desired. Verbal informed consent is obtained.    Patient does not have an allergy to betadine or shellfish.    Time out: immediate members of the procedure team and patient agree to the following: correct patient, correct site, correct procedure to be performed. Nell THRASHER/Jenna Whatley PCT    Able to easily palpate the device in the  Right arm. Additional imaging was not required. The device feels freely mobile and easily accessible. She was placed in the examining table in a supine position with her arm flexed at the elbow and hand under her head. The skin over this site is prepped with Betadine. 2-3 mL of 1% lidocaine with was injected.    Downward pressure was applied on the proximal end of the implant nearest the axilla, and a 2-3 mm incision was made in a longitudinal direction of the arm at the tip of the implant closest to the elbow. The implant was then pushed gently toward the incision until the tip was visible. The fibrous capsule was opened with blunt dissection using a hemostat. The implant was grasp with a hemostat and was easily removed intact. It measured a  full 4 cm in length.    Steristrip was placed over incision site as well as a pressure dressing.     Nexplanon Insertion:    NDC#: 12219-119-12  Lot #: [id number]  Exp date: 06/2027  BH device    Patient's LMP was 06/04/2025  She did have a UPT in office today. The results were Negative.    The new insertion site on the Right arm was identified " approximately 8-10 cm proximal to the humoral epicondyle and 3-4 cm below with sulcus of the triceps and biceps muscle. The skin at the insertion site was stretched by my thumb and index finger. The insertion site was anesthetized about 3 mL of 1% lidocaine. Nex, the needle tip was inserted, bevel side up, at an angle not greater than 30° to the skin surface, just until the skin was penetrated to below the bevel. The applicator was then lowered so that it was parallel to the arm. To minimize the chance of deep incision, the skin was tented upwards with the tip of the needle. The needle was then gently inserted to the full length and the device was fixed in place. I then slowly and carefully retracted the needle back by retracting the release lever. The obturator was seen in the device to determine that the nexplanon had been released.     Both the patient and I were easily able to feel the device under the skin. Steri-Strips were applied at the site, and the arm was gently wrapped with gauze.    There were no complications.   The patient tolerated the procedure well.    She was given a reminder card. She was advised to use condoms as a backup method for at least a week after insertion.     Call if signs of incision opening, redness, swelling, foul odor, increase drainage, or fever.      She understands that the device terms in three years.     Expectations, warning signs and limitations reviewed.     Nell Montano, ANNA MARIE  06/10/2025